# Patient Record
Sex: MALE | Race: WHITE | HISPANIC OR LATINO | URBAN - METROPOLITAN AREA
[De-identification: names, ages, dates, MRNs, and addresses within clinical notes are randomized per-mention and may not be internally consistent; named-entity substitution may affect disease eponyms.]

---

## 2023-09-16 ENCOUNTER — EMERGENCY (EMERGENCY)
Facility: HOSPITAL | Age: 28
LOS: 0 days | Discharge: HOME | End: 2023-09-16
Attending: EMERGENCY MEDICINE
Payer: SELF-PAY

## 2023-09-16 VITALS
DIASTOLIC BLOOD PRESSURE: 68 MMHG | HEART RATE: 65 BPM | TEMPERATURE: 98 F | OXYGEN SATURATION: 97 % | SYSTOLIC BLOOD PRESSURE: 107 MMHG | RESPIRATION RATE: 17 BRPM

## 2023-09-16 VITALS — WEIGHT: 74.96 LBS

## 2023-09-16 DIAGNOSIS — R11.0 NAUSEA: ICD-10-CM

## 2023-09-16 DIAGNOSIS — R51.9 HEADACHE, UNSPECIFIED: ICD-10-CM

## 2023-09-16 PROCEDURE — 99053 MED SERV 10PM-8AM 24 HR FAC: CPT

## 2023-09-16 PROCEDURE — 99284 EMERGENCY DEPT VISIT MOD MDM: CPT

## 2023-09-16 PROCEDURE — 99283 EMERGENCY DEPT VISIT LOW MDM: CPT

## 2023-09-16 RX ORDER — ONDANSETRON 8 MG/1
8 TABLET, FILM COATED ORAL ONCE
Refills: 0 | Status: COMPLETED | OUTPATIENT
Start: 2023-09-16 | End: 2023-09-16

## 2023-09-16 RX ORDER — ACETAMINOPHEN 500 MG
975 TABLET ORAL ONCE
Refills: 0 | Status: COMPLETED | OUTPATIENT
Start: 2023-09-16 | End: 2023-09-16

## 2023-09-16 RX ADMIN — ONDANSETRON 8 MILLIGRAM(S): 8 TABLET, FILM COATED ORAL at 03:53

## 2023-09-16 RX ADMIN — Medication 975 MILLIGRAM(S): at 03:26

## 2023-09-16 NOTE — ED PROVIDER NOTE - NSFOLLOWUPINSTRUCTIONS_ED_ALL_ED_FT
Follow up at the medicine clinic within 2 weeks    Headache    A headache is pain or discomfort felt around the head or neck area. The specific cause of a headache may not be found as there are many types including tension headaches, migraine headaches, and cluster headaches. Watch your condition for any changes. Things you can do to manage your pain include taking over the counter and prescription medications as instructed by your health care provider, lying down in a dark quiet room, limiting stress, getting regular sleep, and refraining from alcohol and tobacco products.    SEEK IMMEDIATE MEDICAL CARE IF YOU HAVE ANY OF THE FOLLOWING SYMPTOMS: fever, vomiting, stiff neck, loss of vision, problems with speech, muscle weakness, loss of balance, trouble walking, passing out, or confusion.  \ Follow up at the medicine clinic within 2 weeks    Cefalea tensional en los adultos  Tension Headache, Adult  La cefalea tensional es alphonse sensación de dolor o presión en la frente y los lados de la lauren. El dolor puede ser sordo o puede sentirse rama alphonse tensión. Hay dos tipos de cefaleas tensionales:  Cefalea tensional episódica. Es cuando las cefaleas se producen menos de 15 días por mes.  Cefalea tensional crónica. Es cuando las cefaleas se producen más de 15 días por mes en un período de 3 meses.  Las cefaleas tensionales pueden durar de 30 minutos a varios días. Constituyen el tipo más frecuente de dolor de lauren. Generalmente, no se asocian con náuseas o vómitos y no empeoran con la actividad física.    ¿Cuáles son las causas?  Se desconoce la causa exacta de esta afección. Las cefaleas tensionales generalmente aparecen después de alphonse situación de estrés, ansiedad o por depresión. Otros factores desencadenantes pueden incluir los siguientes:  Alcohol.  Exceso de cafeína o abstinencia de cafeína.  Infecciones respiratorias, rama resfriados, gripes o sinusitis.  Problemas dentales o apretar los dientes.  Fatiga.  Mantener la lauren y el gianna en la misma posición geraldine un período prolongado, por ejemplo, al usar la computadora.  Fumar.  Artritis del gianna.  ¿Cuáles son los signos o los síntomas?  Los síntomas de esta afección incluyen:  Sensación de presión o tensión alrededor de la lauren.  Dolor sordo en la lauren.  Dolor sobre la frente y los lados de la lauren.  Dolor a la palpación en los músculos de la lauren, del gianna y de los hombros.  ¿Cómo se diagnostica?  Esta afección se puede diagnosticar en función de los síntomas, la historia clínica y los antecedentes médicos.    Si los síntomas son agudos o inusuales, es posible que le shana estudios de diagnóstico por imágenes, rama alphonse exploración por tomografía computarizada (TC) o alphonse resonancia magnética (RM) de la lauren. También le pueden revisar la vista.    ¿Cómo se trata?  Esta afección puede tratarse con cambios en el estilo de terry y medicamentos que ayudan a aliviar los síntomas.    Siga estas instrucciones en martinez casa:  Control del dolor    Use los medicamentos de venta alonzo y los recetados solamente rama se lo haya indicado el médico.  Cuando tenga alphonse cefalea, acuéstese en alphonse habitación oscura y silenciosa.  Si se lo indican, aplíquese hielo en la lauren y en el gianna. Para hacer esto:  Ponga el hielo en alphonse bolsa plástica.  Coloque alphonse toalla entre la piel y la bolsa.  Aplique el hielo geraldine 20 minutos, 2 o 3 veces por día.  Retire el hielo si la piel se pone de color flores brillante. [place] es muy importante. Si no puede sentir dolor, calor o frío, tiene un mayor riesgo de que se dañe la buddy.  Si se lo indican, aplique calor en la buddy posterior del gianna con la frecuencia que le haya indicado el médico. Use la macey de calor que el médico le recomiende, rama alphonse compresa de calor húmedo o alphonse almohadilla térmica.  Coloque alphonse toalla entre la piel y la macey de calor.  Aplique calor geraldine 20 a 30 minutos.  Retire la macey de calor si la piel se pone de color flores brillante. [place] es especialmente importante si no puede sentir dolor, calor o frío. Corre un mayor riesgo de sufrir quemaduras.  Comida y bebida    Mantenga un horario para las comidas.  Si kat alcohol:  Limite la cantidad que kat a lo siguiente:  De 0 a 1 medida por día para las mujeres que no están embarazadas.  De 0 a 2 medidas por día para los hombres.  Sepa cuánta cantidad de alcohol hay en las bebidas que alysha. En los [place], alphonse medida equivale a alphonse botella de cerveza de 12 oz (355 ml), un vaso de vino de 5 oz (148 ml) o un vaso de alphonse bebida alcohólica de amalia graduación de 1½ oz (44 ml).  Arti suficiente líquido rama para mantener la orina de color amarillo pálido.  Disminuya el consumo de cafeína o deje de consumir cafeína.  Estilo de terry    Duerma entre 7 y 9 horas o la cantidad de horas que le haya recomendado el médico.  A la hora de acostarse, retire las computadoras, los teléfonos y las tabletas del dormitorio.  Busque maneras de manejar el estrés. [place] puede incluir:  Realizar actividad física.  Practicar ejercicios de respiración profunda.  Yoga.  Escuchar música.  Visualización mental positiva.  Trate de sentarse derecho y evite tensionar los músculos.  No consuma ningún producto que contenga nicotina o tabaco. Estos incluyen cigarrillos, tabaco para mascar y aparatos de vapeo, rama los cigarrillos electrónicos. Si necesita ayuda para dejar de fumar, consulte al médico.  Instrucciones generales      Evite cualquier desencadenante del dolor de lauren. Lleve un registro diario para averiguar qué puede desencadenar las cefaleas. Registre, por ejemplo, lo siguiente:  Lo que usted come y kat.  El tiempo que duerme.  Algún cambio en martinez dieta o en los medicamentos.  Cumpla con todas las visitas de seguimiento. [place] es importante.  Comuníquese con un médico si:  La cefalea no se srinivas.  La cefalea regresa.  Tiene sensibilidad a los sonidos, la sima o los olores debido a la cefalea.  Tiene náuseas o vómitos.  Le duele el estómago.  Solicite ayuda de inmediato si:  Tiene alphonse cefalea muy intensa y repentina junto con alguno de los siguientes síntomas:  Rigidez en el gianna.  Náuseas y vómitos.  Confusión.  Debilidad en alphonse parte o un lado del cuerpo.  Visión doble o pérdida de la visión.  Falta de aire.  Erupción cutánea.  Somnolencia inusual.  Fiebre o escalofríos.  Dificultad para hablar.  Dolor en el tre o el oído.  Dificultad para caminar o mantener el equilibrio.  Mareo o desmayo.  Resumen  La cefalea tensional es alphonse sensación de dolor o presión en la frente y los lados de la lauren.  Las cefaleas tensionales pueden durar de 30 minutos a varios días. Constituyen el tipo más frecuente de dolor de lauren.  Esta afección se puede diagnosticar en función de los síntomas, la historia clínica y los antecedentes médicos.  Esta afección puede tratarse con cambios en el estilo de terry y medicamentos que ayudan a aliviar los síntomas.  Esta información no tiene rama fin reemplazar el consejo del médico. Asegúrese de hacerle al médico cualquier pregunta que tenga.

## 2023-09-16 NOTE — ED ADULT NURSE NOTE - HISTORY OF COVID-19 VACCINATION
----- Message from Jocelyn Ramos M.D. sent at 6/27/2017  1:47 PM PDT -----  Please let patient know that his prostate numbers continue to decline and are now at 4.8. His cholesterol is normal. We will recheck these in one year.  Jocelyn Ramos M.D.    
Phone Number Called: [phone] (home)       Message: Pt notified of results below.     Left Message for patient to call back: N\A      
Vaccine status unknown

## 2023-09-16 NOTE — ED ADULT NURSE NOTE - OBJECTIVE STATEMENT
Patient is the father of a 5 day old patient  that was BIBA for s/p choking. During his child's stay, father requested Tylenol for HA and was told to register to get Tylenol. Patient is a refugee immigrant from [place], staying at a [facility], as a shelter.

## 2023-09-16 NOTE — ED PROVIDER NOTE - OBJECTIVE STATEMENT
28-year-old male, otherwise healthy presents with complaints of 3 hours of headache.  Associated with nausea without any vomiting.  Patient denies any dizziness, fever, URI symptoms.

## 2023-09-16 NOTE — ED PROVIDER NOTE - CLINICAL SUMMARY MEDICAL DECISION MAKING FREE TEXT BOX
Patient presents for evaluation of headache.  Was treated with meds and symptoms improved.  Will discharge outpatient follow-up

## 2023-09-16 NOTE — ED PROVIDER NOTE - PATIENT PORTAL LINK FT
You can access the FollowMyHealth Patient Portal offered by [facility] by registering at the following website: [email or web address]. By joining [facility]’s FollowMyHealth portal, you will also be able to view your health information using other applications (apps) compatible with our system.